# Patient Record
Sex: MALE | Race: BLACK OR AFRICAN AMERICAN | NOT HISPANIC OR LATINO | URBAN - METROPOLITAN AREA
[De-identification: names, ages, dates, MRNs, and addresses within clinical notes are randomized per-mention and may not be internally consistent; named-entity substitution may affect disease eponyms.]

---

## 2019-06-13 ENCOUNTER — EMERGENCY (EMERGENCY)
Facility: HOSPITAL | Age: 44
LOS: 0 days | Discharge: HOME | End: 2019-06-13
Attending: EMERGENCY MEDICINE
Payer: OTHER MISCELLANEOUS

## 2019-06-13 VITALS
OXYGEN SATURATION: 98 % | HEART RATE: 99 BPM | HEIGHT: 70 IN | SYSTOLIC BLOOD PRESSURE: 173 MMHG | RESPIRATION RATE: 18 BRPM | TEMPERATURE: 98 F | WEIGHT: 220.02 LBS | DIASTOLIC BLOOD PRESSURE: 95 MMHG

## 2019-06-13 DIAGNOSIS — S22.31XA FRACTURE OF ONE RIB, RIGHT SIDE, INITIAL ENCOUNTER FOR CLOSED FRACTURE: ICD-10-CM

## 2019-06-13 DIAGNOSIS — R07.81 PLEURODYNIA: ICD-10-CM
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
## 2019-06-13 PROCEDURE — 99283 EMERGENCY DEPT VISIT LOW MDM: CPT

## 2019-06-13 RX ORDER — ATORVASTATIN CALCIUM 80 MG/1
1 TABLET, FILM COATED ORAL
Qty: 0 | Refills: 0 | DISCHARGE

## 2019-06-13 RX ORDER — OXYCODONE AND ACETAMINOPHEN 5; 325 MG/1; MG/1
1 TABLET ORAL ONCE
Refills: 0 | Status: DISCONTINUED | OUTPATIENT
Start: 2019-06-13 | End: 2019-06-13

## 2019-06-13 RX ORDER — OXYCODONE HYDROCHLORIDE 5 MG/1
1 TABLET ORAL
Qty: 20 | Refills: 0
Start: 2019-06-13 | End: 2019-06-17

## 2019-06-13 RX ADMIN — OXYCODONE AND ACETAMINOPHEN 1 TABLET(S): 5; 325 TABLET ORAL at 18:50

## 2019-06-13 NOTE — ED ADULT NURSE NOTE - CHPI ED NUR SYMPTOMS NEG
no back pain/no stiffness/no bruising/no deformity/no tingling/no difficulty bearing weight/no fever/no weakness/no abrasion/no numbness

## 2019-06-13 NOTE — ED ADULT NURSE NOTE - OBJECTIVE STATEMENT
43yo male with no pertinent pmh presents for evaluation. Pt fell off ladder 2 days ago, ~ 8 foot in air but hit object at 4foot in air into rt flank/rib area. Pt went to hospital had CT chest and abd done. Pt did not wait for results and went home. Pt only taking Motrin 800mg. Pt told he had 2 nondisplaced fxs and told to return to hospital for incentive spirometer and analgesia but pt was unable to make it until today

## 2019-06-13 NOTE — ED ADULT TRIAGE NOTE - CHIEF COMPLAINT QUOTE
pt states he fell off a ladder a few days ago while at work,  c/o right side rib cage pain, states he had CT diagnosed with hairline fx. Pt denies head injury or loc with fall

## 2019-06-13 NOTE — ED PROVIDER NOTE - OBJECTIVE STATEMENT
45yo male with no pertinent pmh presents for evaluation. Pt fell off ladder 2 days ago, ~ 8 foot in air but hit object at 4foot in air into rt flank/rib area. Pt went to hospital had CT chest and abd done. Pt did not wait for results and went home. Pt only taking motrin 800mg. Pt told he had 2 nondisplaced fxs and told to return to hospital for incentive spirometer and analgesia but pt was unable to make it until today. pt denies head strike, LOC. no headache, abd pain.     ROS: No fever/chills. No photophobia/eye pain/changes in vision, No ear pain/sore throat/dysphagia, No chest pain/palpitations. No SOB/cough. No abdominal pain, No N/V/D, no black/bloody bm. No dysuria/frequency/discharge, No headache. No Dizziness.  No rash.  No numbness/tingling/weakness.

## 2019-06-13 NOTE — ED PROVIDER NOTE - PHYSICAL EXAMINATION
Gen: Alert, Well appearing. NAD    Head: NC, AT, PERRL, normal lids/conjunctiva   ENT: Bilateral TM WNL, patent oropharynx without erythema/exudate, uvula midline  Neck: supple, no tenderness/meningismus  Pulm: Bilateral clear BS, normal resp effort  CV: RRR, no M/R/G, +dist pulses   Abd: soft, NT/ND, +BS, no guarding/rebound tenderness  Mskel: extremities x4 with normal ROM. no ctl spine ttp  Skin: ++ ecchymosis rt lower ant/post ribs and flank with tenderness  Neuro: AAOx3, no sensory/motor deficits, CN 2-12 intact

## 2021-02-03 NOTE — ED ADULT NURSE NOTE - NSFALLRSKHARMRISK_ED_ALL_ED
- Baseline AOx3, ambulates with cane   - Likely multifactorial in the setting of basilar a. stenosis, sepsis, COVID   - Answering questions appropriately no
